# Patient Record
Sex: FEMALE | Race: WHITE | NOT HISPANIC OR LATINO | Employment: OTHER | URBAN - NONMETROPOLITAN AREA
[De-identification: names, ages, dates, MRNs, and addresses within clinical notes are randomized per-mention and may not be internally consistent; named-entity substitution may affect disease eponyms.]

---

## 2018-04-16 ENCOUNTER — OFFICE VISIT (OUTPATIENT)
Dept: SURGERY | Facility: CLINIC | Age: 58
End: 2018-04-16

## 2018-04-16 VITALS
WEIGHT: 145.06 LBS | BODY MASS INDEX: 27.39 KG/M2 | SYSTOLIC BLOOD PRESSURE: 128 MMHG | HEIGHT: 61 IN | OXYGEN SATURATION: 98 % | DIASTOLIC BLOOD PRESSURE: 68 MMHG | HEART RATE: 90 BPM | TEMPERATURE: 98.2 F

## 2018-04-16 DIAGNOSIS — K52.9 CHRONIC DIARRHEA: Primary | ICD-10-CM

## 2018-04-16 DIAGNOSIS — N61.0 MASTITIS: ICD-10-CM

## 2018-04-16 PROCEDURE — 99244 OFF/OP CNSLTJ NEW/EST MOD 40: CPT | Performed by: SURGERY

## 2018-04-16 RX ORDER — LISINOPRIL 10 MG/1
TABLET ORAL
Refills: 0 | COMMUNITY
Start: 2018-04-13

## 2018-04-16 RX ORDER — LEVOTHYROXINE SODIUM 0.1 MG/1
100 TABLET ORAL
Refills: 0 | COMMUNITY
Start: 2018-04-13

## 2018-04-16 RX ORDER — CLINDAMYCIN HYDROCHLORIDE 150 MG/1
150 CAPSULE ORAL 3 TIMES DAILY
Refills: 0 | COMMUNITY
Start: 2018-04-02 | End: 2018-05-22

## 2018-04-16 RX ORDER — SERTRALINE HYDROCHLORIDE 100 MG/1
TABLET, FILM COATED ORAL
Refills: 0 | COMMUNITY
Start: 2018-04-11 | End: 2021-05-28

## 2018-04-16 RX ORDER — HYDROCODONE BITARTRATE AND ACETAMINOPHEN 5; 325 MG/1; MG/1
TABLET ORAL
Refills: 0 | COMMUNITY
Start: 2018-04-02 | End: 2021-05-28

## 2018-04-16 NOTE — PROGRESS NOTES
Patient: Pradip Luna    YOB: 1960    Date: 04/16/2018    Primary Care Provider: Khalif Saleh MD    Chief Complaint   Patient presents with   • Cyst     breast cyst   • Diarrhea       Subjective .     History of present illness:  I saw the patient in the office today for an evaluation and treatment of a cyst. She complains of a cyst on her right breast for the past 3 weeks. She states it drained last week and has redness. She states it is not currently draining And it has improved. She complains of associated burning in her right breast, does not radiate, worsens with touch.   She complains of diarrhea approximately 15 times a day for the past two months, worsens with diet. She complains of associated cramping in her lower abdomen. She states she has a history of colitis and her last colonoscopy was in 04/2014. She denies rectal pain and rectal bleeding.  She however also explains that she has had diarrhea for a long time prior to this.  She had a cholecystectomy several years ago.    The following portions of the patient's history were reviewed and updated as appropriate: allergies, current medications, past family history, past medical history, past social history, past surgical history and problem list.      Review of Systems   Constitutional: Negative for chills, fever and unexpected weight change.   HENT: Negative for hearing loss, trouble swallowing and voice change.    Eyes: Negative for visual disturbance.   Respiratory: Negative for apnea, cough, chest tightness, shortness of breath and wheezing.    Cardiovascular: Negative for chest pain, palpitations and leg swelling.   Gastrointestinal: Positive for abdominal pain and diarrhea. Negative for abdominal distention, anal bleeding, blood in stool, constipation, nausea, rectal pain and vomiting.   Endocrine: Negative for cold intolerance and heat intolerance.   Genitourinary: Negative for difficulty urinating, dysuria and flank pain.  "  Musculoskeletal: Negative for back pain and gait problem.   Skin: Negative for color change, rash and wound.        cyst   Neurological: Negative for dizziness, syncope, speech difficulty, weakness, light-headedness, numbness and headaches.   Hematological: Negative for adenopathy. Does not bruise/bleed easily.   Psychiatric/Behavioral: Negative for confusion. The patient is not nervous/anxious.        Allergies:  No Known Allergies    Medications:    Current Outpatient Prescriptions:   •  clindamycin (CLEOCIN) 150 MG capsule, Take 150 mg by mouth 3 (Three) Times a Day., Disp: , Rfl: 0  •  HYDROcodone-acetaminophen (NORCO) 5-325 MG per tablet, take 1 tablet by mouth every 4 to 6 hours if needed pain, Disp: , Rfl: 0  •  levothyroxine (SYNTHROID, LEVOTHROID) 100 MCG tablet, Take 100 mcg by mouth Every Morning Before Breakfast., Disp: , Rfl: 0  •  lisinopril (PRINIVIL,ZESTRIL) 10 MG tablet, , Disp: , Rfl: 0  •  sertraline (ZOLOFT) 100 MG tablet, take 1/2 tablet by mouth once daily for 7 days then INCREASE TO 1 tablet once daily, Disp: , Rfl: 0    History\"  Past Medical History:   Diagnosis Date   • Fibrocystic breast    • Hypertension        Past Surgical History:   Procedure Laterality Date   • BREAST SURGERY     • GALLBLADDER SURGERY     • HYSTERECTOMY         Family History   Problem Relation Age of Onset   • Cancer Mother    • Kidney disease Mother    • Heart disease Father        Social History   Substance Use Topics   • Smoking status: Current Every Day Smoker   • Smokeless tobacco: Never Used   • Alcohol use No        Objective     Vital Signs:   /68   Pulse 90   Temp 98.2 °F (36.8 °C)   Ht 154.9 cm (61\")   Wt 65.8 kg (145 lb 1 oz)   SpO2 98%   BMI 27.41 kg/m²     Physical Exam:   General Appearance:    Alert, cooperative, in no acute distress   Head:    Normocephalic, without obvious abnormality, atraumatic   Eyes:            Lids and lashes normal, conjunctivae and sclerae normal, no   icterus, " no pallor, corneas clear, PERRLA   Ears:    Ears appear intact with no abnormalities noted   Lungs:     Clear to auscultation,respirations regular, even and                  unlabored    Heart:    Regular rhythm and normal rate, normal S1 and S2, no            murmur, no gallop, no rub, no click   Chest Wall:    No abnormalities observed   Abdomen:     Normal bowel sounds, no masses, no organomegaly, soft        non-tender, non-distended, no guarding, no rebound                tenderness   Extremities:   Moves all extremities well, no edema, no cyanosis, no             redness   Skin:   No bleeding, bruising or rash   Neurologic:   Cranial nerves 2 - 12 grossly intact, sensation intact,   Psychiatric: No evidence of depression or anxiety   Breast exam: Left breast grossly normal.  No dominant masses or suspicious findings.  The right breast appears to have resolving inflammation around the [place].  There's suggestive evidence of possible sebaceous cyst medial to the nipple. similar findings however seen in the left breast just medial to the nipple as well.       Results Review:   I reviewed the patient's new clinical results.    Assessment/Plan     1. Chronic diarrhea    2. Mastitis :Resolving at this time.  Finish the antibiotics.  I recommend an ultrasound to further evaluate that area and we'll see her in the office for this.  We'll see her after her mammogram.         For the chronic diarrhea recommend a colonoscopy.  She understands this procedure as well as the risk of bleeding and perforation and she understands ramifications of these potential complications and she wishes to proceed.  Last colonoscopy was approximately 4 years ago.    Electronically signed by Ed Payne MD  04/16/18    Scribed for Ed Payne MD by Domenica Stewart. 4/16/2018  3:32 PM    .

## 2018-05-07 ENCOUNTER — OUTSIDE FACILITY SERVICE (OUTPATIENT)
Dept: SURGERY | Facility: CLINIC | Age: 58
End: 2018-05-07

## 2018-05-07 ENCOUNTER — TELEPHONE (OUTPATIENT)
Dept: SURGERY | Facility: CLINIC | Age: 58
End: 2018-05-07

## 2018-05-22 ENCOUNTER — OFFICE VISIT (OUTPATIENT)
Dept: SURGERY | Facility: CLINIC | Age: 58
End: 2018-05-22

## 2018-05-22 VITALS
OXYGEN SATURATION: 99 % | DIASTOLIC BLOOD PRESSURE: 74 MMHG | HEART RATE: 92 BPM | BODY MASS INDEX: 26.58 KG/M2 | WEIGHT: 140.8 LBS | SYSTOLIC BLOOD PRESSURE: 138 MMHG | HEIGHT: 61 IN | TEMPERATURE: 98.1 F

## 2018-05-22 DIAGNOSIS — N61.0 MASTITIS: ICD-10-CM

## 2018-05-22 DIAGNOSIS — K52.9 CHRONIC DIARRHEA: Primary | ICD-10-CM

## 2018-05-22 PROCEDURE — 99213 OFFICE O/P EST LOW 20 MIN: CPT | Performed by: SURGERY

## 2018-05-22 RX ORDER — POLYETHYLENE GLYCOL 3350 17 G/17G
238 POWDER, FOR SOLUTION ORAL ONCE
Qty: 14 PACKET | Refills: 0 | Status: SHIPPED | OUTPATIENT
Start: 2018-05-22 | End: 2018-05-22

## 2018-05-22 RX ORDER — BISACODYL 5 MG/1
5 TABLET, DELAYED RELEASE ORAL DAILY
Qty: 4 TABLET | Refills: 0 | Status: SHIPPED | OUTPATIENT
Start: 2018-05-22 | End: 2021-05-28

## 2018-05-22 RX ORDER — PRAVASTATIN SODIUM 20 MG
20 TABLET ORAL
Refills: 0 | COMMUNITY
Start: 2018-04-17 | End: 2021-05-28

## 2018-05-22 NOTE — PROGRESS NOTES
Patient: Pradip Luna    YOB: 1960    Date: 05/22/2018    Primary Care Provider: Khalif Saleh MD    Chief Complaint   Patient presents with   • Follow-up     follow up mastitis       History: I saw the patient in the office today for an evaluation and follow up for mastitis. She had a mammogram on 05/04/18 that was BIRADS 1. She states she has finished her antibiotics. She complains of significant improvement in her breast.Actually has no issues currently.  She denies breast pain, nipple drainage and skin discoloration. She also complains of continued diarrhea.  We have her plan for a colonoscopy.  She is scheduled for a colonoscopy on 06/18/18. She states her diarrhea has improved but not subsided since her last visit.     The following portions of the patient's history were reviewed and updated as appropriate: allergies, current medications, past family history, past medical history, past social history, past surgical history and problem list.      Review of Systems   Constitutional: Negative for chills, fever and unexpected weight change.   HENT: Negative for hearing loss, trouble swallowing and voice change.    Eyes: Negative for visual disturbance.   Respiratory: Negative for apnea, cough, chest tightness, shortness of breath and wheezing.    Cardiovascular: Negative for chest pain, palpitations and leg swelling.   Gastrointestinal: Negative for abdominal distention, abdominal pain, anal bleeding, blood in stool, constipation, diarrhea, nausea, rectal pain and vomiting.   Endocrine: Negative for cold intolerance and heat intolerance.   Genitourinary: Negative for difficulty urinating, dysuria and flank pain.   Musculoskeletal: Negative for back pain and gait problem.   Skin: Negative for color change, rash and wound.   Neurological: Negative for dizziness, syncope, speech difficulty, weakness, light-headedness, numbness and headaches.   Hematological: Negative for adenopathy. Does not  "bruise/bleed easily.   Psychiatric/Behavioral: Negative for confusion. The patient is not nervous/anxious.        Vital Signs  Vitals:    05/22/18 0915   BP: 138/74   Pulse: 92   Temp: 98.1 °F (36.7 °C)   SpO2: 99%   Weight: 63.9 kg (140 lb 12.8 oz)   Height: 154.9 cm (60.98\")   History\"  Medical History        Past Medical History:   Diagnosis Date   • Fibrocystic breast     • Hypertension              Surgical History         Past Surgical History:   Procedure Laterality Date   • BREAST SURGERY       • GALLBLADDER SURGERY       • HYSTERECTOMY                      Family History   Problem Relation Age of Onset   • Cancer Mother     • Kidney disease Mother     • Heart disease Father                Social History   Substance Use Topics   • Smoking status: Current Every Day Smoker   • Smokeless tobacco: Never Used   • Alcohol use No          Allergies:  No Known Allergies    Medications:    Current Outpatient Prescriptions:   •  HYDROcodone-acetaminophen (NORCO) 5-325 MG per tablet, take 1 tablet by mouth every 4 to 6 hours if needed pain, Disp: , Rfl: 0  •  levothyroxine (SYNTHROID, LEVOTHROID) 100 MCG tablet, Take 100 mcg by mouth Every Morning Before Breakfast., Disp: , Rfl: 0  •  lisinopril (PRINIVIL,ZESTRIL) 10 MG tablet, , Disp: , Rfl: 0  •  pravastatin (PRAVACHOL) 20 MG tablet, Take 20 mg by mouth every night at bedtime., Disp: , Rfl: 0  •  sertraline (ZOLOFT) 100 MG tablet, take 1/2 tablet by mouth once daily for 7 days then INCREASE TO 1 tablet once daily, Disp: , Rfl: 0    Physical Exam:   General Appearance:    Alert, cooperative, in no acute distress   Head:    Normocephalic, without obvious abnormality, atraumatic   Lungs:     And auscultation without rhonchi or crackles     Heart:    Regular rate and rhythm    Abdomen:    Nontender and nondistended    Extremities:   Moves all extremities well, no edema, no cyanosis, no             redness   Skin:   No bleeding, bruising or rash  Breast exam: Both breasts " were examined.  No abnormalities of significance identified.  No dominant masses or other lesions.       Results Review:   I reviewed the patient's new clinical results.     Assessment/Plan     1. Chronic diarrhea    2. Mastitis      As far as the mastitis is concerned this is resolved at this time.  Follow-up as needed.  For the chronic diarrhea and for screening purposes I recommend a colonoscopy.  She understands the procedure as well as the risks of bleeding and perforation and wishes to proceed.    Electronically signed by Ed Payne MD  05/22/18    Scribed for Ed Payne MD by Domenica Stewart. 5/22/2018  9:35 AM

## 2018-06-14 ENCOUNTER — TELEPHONE (OUTPATIENT)
Dept: SURGERY | Facility: CLINIC | Age: 58
End: 2018-06-14

## 2021-05-28 ENCOUNTER — OFFICE VISIT (OUTPATIENT)
Dept: NEUROSURGERY | Facility: CLINIC | Age: 61
End: 2021-05-28

## 2021-05-28 VITALS — BODY MASS INDEX: 21.67 KG/M2 | WEIGHT: 114.8 LBS | HEIGHT: 61 IN | TEMPERATURE: 96.8 F

## 2021-05-28 DIAGNOSIS — M54.9 MECHANICAL BACK PAIN: Primary | ICD-10-CM

## 2021-05-28 DIAGNOSIS — M51.36 DDD (DEGENERATIVE DISC DISEASE), LUMBAR: ICD-10-CM

## 2021-05-28 DIAGNOSIS — M47.819 FACET ARTHROPATHY: ICD-10-CM

## 2021-05-28 PROCEDURE — 99203 OFFICE O/P NEW LOW 30 MIN: CPT | Performed by: NEUROLOGICAL SURGERY

## 2021-05-28 RX ORDER — AMOXICILLIN AND CLAVULANATE POTASSIUM 875; 125 MG/1; MG/1
TABLET, FILM COATED ORAL
COMMUNITY
Start: 2021-05-21

## 2021-05-28 RX ORDER — CETIRIZINE HYDROCHLORIDE 10 MG/1
10 TABLET ORAL DAILY PRN
COMMUNITY
Start: 2021-04-25

## 2021-05-28 RX ORDER — BROMPHENIRAMINE MALEATE, PSEUDOEPHEDRINE HYDROCHLORIDE, AND DEXTROMETHORPHAN HYDROBROMIDE 2; 30; 10 MG/5ML; MG/5ML; MG/5ML
SYRUP ORAL
COMMUNITY
Start: 2021-05-21

## 2021-05-28 NOTE — PROGRESS NOTES
Patient: Pradip Luna  : 1960    Primary Care Provider: Khalif Saleh MD    Requesting Provider: As above        History    Chief Complaint: Global body pain.    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  She has had rather global pain.  She has pain in her neck, her mid back, her low back, her arms, and her legs.  Her low back bothers her good deal.  She does not really describe radicular pain.  When she took Neurontin and Norco she felt better.  She is not taking that now.  She has no time for physical therapy.  All activity makes her worse.  She has to frequently change positions to get comfortable.  She has no bowel or bladder dysfunction.    Review of Systems   Constitutional: Negative for activity change, appetite change, chills, diaphoresis, fatigue, fever and unexpected weight change.   HENT: Positive for sinus pressure. Negative for congestion, dental problem, drooling, ear discharge, ear pain, facial swelling, hearing loss, mouth sores, nosebleeds, postnasal drip, rhinorrhea, sinus pain, sneezing, sore throat, tinnitus, trouble swallowing and voice change.    Eyes: Negative for photophobia, pain, discharge, redness, itching and visual disturbance.   Respiratory: Negative for apnea, cough, choking, chest tightness, shortness of breath, wheezing and stridor.    Cardiovascular: Negative for chest pain, palpitations and leg swelling.   Gastrointestinal: Negative for abdominal distention, abdominal pain, anal bleeding, blood in stool, constipation, diarrhea, nausea, rectal pain and vomiting.   Endocrine: Negative for cold intolerance, heat intolerance, polydipsia, polyphagia and polyuria.   Genitourinary: Negative for decreased urine volume, difficulty urinating, dyspareunia, dysuria, enuresis, flank pain, frequency, genital sores, hematuria, menstrual problem, pelvic pain, urgency, vaginal bleeding, vaginal discharge and vaginal pain.  "  Musculoskeletal: Positive for back pain, neck pain and neck stiffness. Negative for arthralgias, gait problem, joint swelling and myalgias.   Skin: Negative for color change, pallor, rash and wound.   Allergic/Immunologic: Positive for environmental allergies. Negative for food allergies and immunocompromised state.   Neurological: Positive for weakness and headaches. Negative for dizziness, tremors, seizures, syncope, facial asymmetry, speech difficulty, light-headedness and numbness.   Hematological: Negative for adenopathy. Does not bruise/bleed easily.   Psychiatric/Behavioral: Negative for agitation, behavioral problems, confusion, decreased concentration, dysphoric mood, hallucinations, self-injury, sleep disturbance and suicidal ideas. The patient is not nervous/anxious and is not hyperactive.        The patient's past medical history, past surgical history, family history, and social history have been reviewed at length in the electronic medical record.    Physical Exam:   Temp 96.8 °F (36 °C)   Ht 154.9 cm (61\")   Wt 52.1 kg (114 lb 12.8 oz)   BMI 21.69 kg/m²   CONSTITUTIONAL: Patient is well-nourished, pleasant and appears stated age.  CV: Heart regular rate and rhythm without murmur, rub, or gallop.  PULMONARY: Lungs are clear to ascultation.  MUSCULOSKELETAL:  Straight leg raising is negative.  Fortino's Sign is negative.  ROM in the low back is normal.  Tenderness in the back to palpation is not observed.  NEUROLOGICAL:  Orientation, memory, attention span, language function, and cognition have been examined and are intact.  Strength is intact in the lower extremities to direct testing.  Muscle tone is normal throughout.  Station and gait are normal.  Sensation is intact to light touch testing throughout.  Deep tendon reflexes are 1+ and symmetrical.  Coordination is intact.      Medical Decision Making    Data Review:   (All imaging studies were personally reviewed unless stated otherwise)  MRI of " the lumbar spine demonstrates some diffuse degenerative disc disease and facet arthropathy.  There are some moderate stenosis at a couple of levels.    Diagnosis:   1.  Global pain syndrome.  2.  Mechanical low back pain.  3.  Lumbar stenosis without neurogenic claudication.    Treatment Options:   Ms. uLna has symptoms that are very far reaching.  There is no role for focal neurosurgical intervention.  She will follow-up with her primary care provider.       Diagnosis Plan   1. Mechanical back pain     2. DDD (degenerative disc disease), lumbar     3. Facet arthropathy         Scribed for Blaine Murrieta MD by RAVEN Hoskins 5/28/2021 15:37 EDT    I, Dr. Murrieta, personally performed the services described in the documentation, as scribed in my presence, and it is both accurate and complete.